# Patient Record
Sex: FEMALE | Race: WHITE | Employment: PART TIME | URBAN - METROPOLITAN AREA
[De-identification: names, ages, dates, MRNs, and addresses within clinical notes are randomized per-mention and may not be internally consistent; named-entity substitution may affect disease eponyms.]

---

## 2018-07-25 ENCOUNTER — HOSPITAL ENCOUNTER (OUTPATIENT)
Dept: GENERAL RADIOLOGY | Age: 68
Discharge: HOME | End: 2018-07-25
Payer: MEDICARE

## 2018-07-25 DIAGNOSIS — M21.612 BUNION, LEFT: ICD-10-CM

## 2018-07-25 PROCEDURE — 73630 X-RAY EXAM OF FOOT: CPT

## 2018-10-16 RX ORDER — FAMOTIDINE 20 MG/1
20 TABLET, FILM COATED ORAL AS NEEDED
Status: ON HOLD | COMMUNITY
End: 2020-03-18

## 2018-10-16 RX ORDER — ACETAMINOPHEN 500 MG
500 TABLET ORAL
COMMUNITY

## 2018-10-16 RX ORDER — MELOXICAM 7.5 MG/1
7.5 TABLET ORAL
COMMUNITY

## 2018-10-16 RX ORDER — DIPHENHYDRAMINE HCL 25 MG
25 TABLET ORAL
Status: ON HOLD | COMMUNITY
End: 2018-10-19

## 2018-10-18 ENCOUNTER — ANESTHESIA EVENT (OUTPATIENT)
Dept: SURGERY | Age: 68
End: 2018-10-18
Payer: MEDICARE

## 2018-10-18 RX ORDER — SODIUM CHLORIDE, SODIUM LACTATE, POTASSIUM CHLORIDE, CALCIUM CHLORIDE 600; 310; 30; 20 MG/100ML; MG/100ML; MG/100ML; MG/100ML
125 INJECTION, SOLUTION INTRAVENOUS CONTINUOUS
Status: CANCELLED | OUTPATIENT
Start: 2018-10-18

## 2018-10-18 RX ORDER — HYDROMORPHONE HYDROCHLORIDE 1 MG/ML
.25-1 INJECTION, SOLUTION INTRAMUSCULAR; INTRAVENOUS; SUBCUTANEOUS
Status: CANCELLED | OUTPATIENT
Start: 2018-10-18

## 2018-10-18 RX ORDER — DIPHENHYDRAMINE HYDROCHLORIDE 50 MG/ML
12.5 INJECTION, SOLUTION INTRAMUSCULAR; INTRAVENOUS AS NEEDED
Status: CANCELLED | OUTPATIENT
Start: 2018-10-18 | End: 2018-10-18

## 2018-10-18 RX ORDER — MIDAZOLAM HYDROCHLORIDE 1 MG/ML
2 INJECTION, SOLUTION INTRAMUSCULAR; INTRAVENOUS
Status: CANCELLED | OUTPATIENT
Start: 2018-10-18

## 2018-10-18 NOTE — H&P
Gill Vaughan DPM - Ricardo Bowser DPM 
                              Podiatric Surgery Pre-Operative History & Physical 
 
Subjective: 
 
Notes pain and deformity to left foot Pt has tried conservative measures such as padding strapping and wider shoes which have failed. Pt at this time ready to undergo surgical correction for their condition. There are no interval updates to the patient's H&P since PCP clearance. History: LEFT FOOT BUNION  
PLANTAR FASCIA FIBROMATOSIS Allergies Allergen Reactions  Sulfa (Sulfonamide Antibiotics) Unknown (comments) Family History Problem Relation Age of Onset  Stroke Mother  Heart Disease Father Past Medical History:  
Diagnosis Date  Arthritis 10/16/2018 Spine / neck  GERD (gastroesophageal reflux disease) Past Surgical History:  
Procedure Laterality Date  HX ADENOIDECTOMY  HX CHOLECYSTECTOMY 25 years ago  HX COLONOSCOPY    
 HX OTHER SURGICAL  2002 Cyst removed from back  HX TONSILLECTOMY Social History Tobacco Use  Smoking status: Never Smoker  Smokeless tobacco: Never Used Substance Use Topics  Alcohol use: No  
   
Social History Substance and Sexual Activity Alcohol Use No  
 
Social History Substance and Sexual Activity Drug Use No  
  
Social History Tobacco Use Smoking Status Never Smoker Smokeless Tobacco Never Used No current facility-administered medications for this encounter. Current Outpatient Medications Medication Sig  diphenhydrAMINE (BENADRYL ALLERGY) 25 mg tablet Take 25 mg by mouth two (2) times daily as needed for Sleep. Some type of generic allergy tablet  acetaminophen (TYLENOL EXTRA STRENGTH) 500 mg tablet Take 500 mg by mouth every six (6) hours as needed for Pain.  famotidine (PEPCID) 20 mg tablet Take 20 mg by mouth as needed.  Wheat Dextrin (BENEFIBER CLEAR) 3 gram/3.5 gram pwpk Take  by mouth daily (after breakfast).  meloxicam (MOBIC) 7.5 mg tablet Take 7.5 mg by mouth two (2) times daily as needed for Pain. Objective: 
Vitals: No data found. CV: regular rate / rhythm, +S1 / S2 
 
Pulm: lungs clear to ascultation b/l, no wheezes/rales/rhonchi Vascular: 
DP 2 of 4, PT 2 of 4, capillary fill time brisk, edema is not present, skin temperature is WNL in temp turgor and elasticity, varicosities are present B/L. Dermatological: 
 
Nails are normal, 2 mm thick, with subungual debris. Skin is dry. Neurological: DTR are present, protective sensation per 5.07 Winston Ginny monofilament is intact, patient is AAOx3, mood is normal. 
 
Orthopedic: 
 
Pain on palpation left medial 1st MPJ. Hallux valgus deformity- left foot ROM decreased in DF upon WB 
ROM 1st Met cunieform within 10 in DF- no elevatus Deformity is trackbound B/L LE are asymmetric, ROM of ankle, STJ, 1st MTPJ is WNL, MMT 5/5 for B/L LE. Constitutional: Pt is a well developed elderly thin female Lymphatics: no tenderness to palpation of neck/axillary/inguinal nodes. Imaging: LFXR: 
 IMPRESSION:  No acute abnormality.  Moderate first MTP joint degenerative change  
with hallux valgus deformity. Labs: 
No results for input(s): HGB, HCT, INR, NA, K, CL, CO2, BUN, CREA, GLU, HGBEXT, HCTEXT in the last 72 hours. No lab exists for component: INREXT Assessment/Plan: 
 
 
79year old female with PMH significant for Osteoathritis and GERD had a painful hallux valgus - Pt to OR for surgical correction of hallux valgus deformity. 
-   All alternatives, risks, benefits, and complications of proposed procedure(s) discussed at length with the patient. All questions/concerns answered/addressed. No gaurentees given.

## 2018-10-19 ENCOUNTER — ANESTHESIA (OUTPATIENT)
Dept: SURGERY | Age: 68
End: 2018-10-19
Payer: MEDICARE

## 2018-10-19 ENCOUNTER — HOSPITAL ENCOUNTER (OUTPATIENT)
Age: 68
Setting detail: OUTPATIENT SURGERY
Discharge: HOME | End: 2018-10-19
Attending: PODIATRIST | Admitting: PODIATRIST
Payer: MEDICARE

## 2018-10-19 ENCOUNTER — APPOINTMENT (OUTPATIENT)
Dept: GENERAL RADIOLOGY | Age: 68
End: 2018-10-19
Attending: PODIATRIST
Payer: MEDICARE

## 2018-10-19 VITALS
DIASTOLIC BLOOD PRESSURE: 67 MMHG | BODY MASS INDEX: 26.4 KG/M2 | HEART RATE: 46 BPM | HEIGHT: 60 IN | TEMPERATURE: 98.7 F | SYSTOLIC BLOOD PRESSURE: 125 MMHG | OXYGEN SATURATION: 99 % | RESPIRATION RATE: 12 BRPM | WEIGHT: 134.48 LBS

## 2018-10-19 DIAGNOSIS — G89.18 POST-OP PAIN: Primary | ICD-10-CM

## 2018-10-19 PROCEDURE — 77030022323 HC BLD RASP RECIP BRSM -B: Performed by: PODIATRIST

## 2018-10-19 PROCEDURE — C1769 GUIDE WIRE: HCPCS | Performed by: PODIATRIST

## 2018-10-19 PROCEDURE — C1713 ANCHOR/SCREW BN/BN,TIS/BN: HCPCS | Performed by: PODIATRIST

## 2018-10-19 PROCEDURE — 74011000250 HC RX REV CODE- 250: Performed by: PODIATRIST

## 2018-10-19 PROCEDURE — 77030000032 HC CUF TRNQT ZIMM -B: Performed by: PODIATRIST

## 2018-10-19 PROCEDURE — 77030018836 HC SOL IRR NACL ICUM -A: Performed by: PODIATRIST

## 2018-10-19 PROCEDURE — 73630 X-RAY EXAM OF FOOT: CPT

## 2018-10-19 PROCEDURE — 77030002933 HC SUT MCRYL J&J -A: Performed by: PODIATRIST

## 2018-10-19 PROCEDURE — 77030020782 HC GWN BAIR PAWS FLX 3M -B

## 2018-10-19 PROCEDURE — 77030006632 HC BLD ENDOSC DISP STRY -C: Performed by: PODIATRIST

## 2018-10-19 PROCEDURE — 76210000026 HC REC RM PH II 1 TO 1.5 HR: Performed by: PODIATRIST

## 2018-10-19 PROCEDURE — 76010000149 HC OR TIME 1 TO 1.5 HR: Performed by: PODIATRIST

## 2018-10-19 PROCEDURE — 77030020268 HC MISC GENERAL SUPPLY: Performed by: PODIATRIST

## 2018-10-19 PROCEDURE — 74011250636 HC RX REV CODE- 250/636

## 2018-10-19 PROCEDURE — 74011250636 HC RX REV CODE- 250/636: Performed by: PODIATRIST

## 2018-10-19 PROCEDURE — 77030006773 HC BLD SAW OSC BRSM -A: Performed by: PODIATRIST

## 2018-10-19 PROCEDURE — 73620 X-RAY EXAM OF FOOT: CPT

## 2018-10-19 PROCEDURE — 76060000033 HC ANESTHESIA 1 TO 1.5 HR: Performed by: PODIATRIST

## 2018-10-19 PROCEDURE — 74011250636 HC RX REV CODE- 250/636: Performed by: ANESTHESIOLOGY

## 2018-10-19 RX ORDER — SODIUM CHLORIDE 0.9 % (FLUSH) 0.9 %
5-10 SYRINGE (ML) INJECTION AS NEEDED
Status: CANCELLED | OUTPATIENT
Start: 2018-10-19

## 2018-10-19 RX ORDER — SODIUM CHLORIDE, SODIUM LACTATE, POTASSIUM CHLORIDE, CALCIUM CHLORIDE 600; 310; 30; 20 MG/100ML; MG/100ML; MG/100ML; MG/100ML
125 INJECTION, SOLUTION INTRAVENOUS CONTINUOUS
Status: DISCONTINUED | OUTPATIENT
Start: 2018-10-19 | End: 2018-10-19 | Stop reason: HOSPADM

## 2018-10-19 RX ORDER — NALOXONE HYDROCHLORIDE 0.4 MG/ML
0.2 INJECTION, SOLUTION INTRAMUSCULAR; INTRAVENOUS; SUBCUTANEOUS
Status: DISCONTINUED | OUTPATIENT
Start: 2018-10-19 | End: 2018-10-19 | Stop reason: HOSPADM

## 2018-10-19 RX ORDER — CEPHALEXIN 250 MG/1
500 CAPSULE ORAL 4 TIMES DAILY
Qty: 20 CAP | Refills: 0 | Status: ON HOLD | OUTPATIENT
Start: 2018-10-19 | End: 2020-03-18

## 2018-10-19 RX ORDER — IBUPROFEN 200 MG
800 TABLET ORAL
Qty: 60 TAB | Refills: 2 | Status: ON HOLD | OUTPATIENT
Start: 2018-10-19 | End: 2020-03-18

## 2018-10-19 RX ORDER — SODIUM CHLORIDE 0.9 % (FLUSH) 0.9 %
5-10 SYRINGE (ML) INJECTION EVERY 8 HOURS
Status: CANCELLED | OUTPATIENT
Start: 2018-10-19

## 2018-10-19 RX ORDER — DEXAMETHASONE SODIUM PHOSPHATE 4 MG/ML
INJECTION, SOLUTION INTRA-ARTICULAR; INTRALESIONAL; INTRAMUSCULAR; INTRAVENOUS; SOFT TISSUE AS NEEDED
Status: DISCONTINUED | OUTPATIENT
Start: 2018-10-19 | End: 2018-10-19 | Stop reason: HOSPADM

## 2018-10-19 RX ORDER — LIDOCAINE HYDROCHLORIDE 10 MG/ML
0.1 INJECTION, SOLUTION EPIDURAL; INFILTRATION; INTRACAUDAL; PERINEURAL AS NEEDED
Status: DISCONTINUED | OUTPATIENT
Start: 2018-10-19 | End: 2018-10-19 | Stop reason: HOSPADM

## 2018-10-19 RX ORDER — CEFAZOLIN SODIUM/WATER 2 G/20 ML
2 SYRINGE (ML) INTRAVENOUS ONCE
Status: COMPLETED | OUTPATIENT
Start: 2018-10-19 | End: 2018-10-19

## 2018-10-19 RX ORDER — HYDROCODONE BITARTRATE AND ACETAMINOPHEN 5; 325 MG/1; MG/1
1 TABLET ORAL
Qty: 40 TAB | Status: ON HOLD | OUTPATIENT
Start: 2018-10-19 | End: 2020-03-18

## 2018-10-19 RX ORDER — PROPOFOL 10 MG/ML
INJECTION, EMULSION INTRAVENOUS AS NEEDED
Status: DISCONTINUED | OUTPATIENT
Start: 2018-10-19 | End: 2018-10-19 | Stop reason: HOSPADM

## 2018-10-19 RX ORDER — FENTANYL CITRATE 50 UG/ML
INJECTION, SOLUTION INTRAMUSCULAR; INTRAVENOUS AS NEEDED
Status: DISCONTINUED | OUTPATIENT
Start: 2018-10-19 | End: 2018-10-19 | Stop reason: HOSPADM

## 2018-10-19 RX ORDER — PROPOFOL 10 MG/ML
INJECTION, EMULSION INTRAVENOUS
Status: DISCONTINUED | OUTPATIENT
Start: 2018-10-19 | End: 2018-10-19 | Stop reason: HOSPADM

## 2018-10-19 RX ORDER — FLUMAZENIL 0.1 MG/ML
0.2 INJECTION INTRAVENOUS
Status: DISCONTINUED | OUTPATIENT
Start: 2018-10-19 | End: 2018-10-19 | Stop reason: HOSPADM

## 2018-10-19 RX ORDER — LIDOCAINE HYDROCHLORIDE 20 MG/ML
INJECTION, SOLUTION INFILTRATION; PERINEURAL AS NEEDED
Status: DISCONTINUED | OUTPATIENT
Start: 2018-10-19 | End: 2018-10-19 | Stop reason: HOSPADM

## 2018-10-19 RX ORDER — MIDAZOLAM HYDROCHLORIDE 1 MG/ML
INJECTION, SOLUTION INTRAMUSCULAR; INTRAVENOUS AS NEEDED
Status: DISCONTINUED | OUTPATIENT
Start: 2018-10-19 | End: 2018-10-19 | Stop reason: HOSPADM

## 2018-10-19 RX ORDER — BUPIVACAINE HYDROCHLORIDE 5 MG/ML
INJECTION, SOLUTION EPIDURAL; INTRACAUDAL AS NEEDED
Status: DISCONTINUED | OUTPATIENT
Start: 2018-10-19 | End: 2018-10-19 | Stop reason: HOSPADM

## 2018-10-19 RX ADMIN — FENTANYL CITRATE 50 MCG: 50 INJECTION, SOLUTION INTRAMUSCULAR; INTRAVENOUS at 11:13

## 2018-10-19 RX ADMIN — Medication 2 G: at 11:12

## 2018-10-19 RX ADMIN — PROPOFOL 30 MG: 10 INJECTION, EMULSION INTRAVENOUS at 11:16

## 2018-10-19 RX ADMIN — PROPOFOL 100 MCG/KG/MIN: 10 INJECTION, EMULSION INTRAVENOUS at 11:16

## 2018-10-19 RX ADMIN — LIDOCAINE HYDROCHLORIDE 40 MG: 20 INJECTION, SOLUTION INFILTRATION; PERINEURAL at 11:12

## 2018-10-19 RX ADMIN — MIDAZOLAM HYDROCHLORIDE 2 MG: 1 INJECTION, SOLUTION INTRAMUSCULAR; INTRAVENOUS at 11:09

## 2018-10-19 RX ADMIN — PROPOFOL 20 MG: 10 INJECTION, EMULSION INTRAVENOUS at 11:20

## 2018-10-19 RX ADMIN — SODIUM CHLORIDE, SODIUM LACTATE, POTASSIUM CHLORIDE, AND CALCIUM CHLORIDE 125 ML/HR: 600; 310; 30; 20 INJECTION, SOLUTION INTRAVENOUS at 09:13

## 2018-10-19 NOTE — DISCHARGE INSTRUCTIONS
Karen Bergman DPM - Jhonny Hillman DPADRIENNE - Nathanielella Antwon Cuadra, DP                                       Podiatric Surgery Post-Operative Instructions    1. Foot surgery is more inconvenient than painful. Although it is normal to have some pain after surgery, you may be surprised at how well you actually feel. In spite of this, it is imperative that you follow the instructions carefully and prepare yourself and your family for an extended period of rest and convalescence. 2. Numbness in the feet will wear off after approximately five to twelve hours (this will vary patient to patient). 3. Start taking your pain medication immediately upon returning home. First take the anti-inflammatory medicine with food or milk. Take the anti-inflammatory even if you experience no pain. The painkiller is only for pain not relieved by the anti-inflammatory and should be taken only if needed. Stop the medication if you develop any abnormal rash, stomach pains, or unusual sensation and call the office. The following medications have been prescribed to you:    Rx pain killer: Hydrocodone 5/325 one tab by mouth every 4 hrs as needed for pain    Rx antibiotic: Keflex 500 mg one tab by mouth twice daily for 10 days    Rx anti-inflammatoroies: Motrin 800mg one tab by mouth every 8 hours with food    4. Black and blue toes or black and blue in areas un-bandaged are a normal occurrence and resolves unremarkably after surgery. 5. Use a shower protector prescribed by your doctor (can obtain from [employer]), doubled up plastic trash bags with rubber bands, or you may sponge bathe. The bandage must be kept completely dry after surgery. Notify the doctor if your bandage becomes wet. 6. Your feet should be elevated above your hip at all times. DO not sit with the foot on a hassock or chair.   When traveling, sit in the back seat with the foot elevated on pillows. You should sleep with the surgical shoe on for the first three weeks post-operatively or until notified by your doctor that it is safe to be removed while sleeping. 7. Ice may be indicated for your surgery, you should put a cold pack behind the knee of the operative leg, 20 minutes on, 20 minutes off, especially in the first 1-2 days post op. This will reduce pain and swelling and aid in a faster recovery. Instructions for Walking After Foot Surgery    1. Weightbearing status: left foot heel weight bearing  2. Most patients can walk with only the surgical shoe after foot surgery (canes and walkers may be needed for additional stability; this will be determined by the doctor or nurses post-operatively. 3. Crutch walking is usually not necessary post-operatively. Many patients cannot use crutches safely due to body type, muscle weakness, or balance issues. In fact, these patients are usually at a greater risk of falling with crutches than without. If crutches are recommended you will be instructed as to their proper use. The following are important rules regarding walking after foot surgery for the first one to three weeks: You may walk or stand for bathroom or meals only. No prolonged standing or walking. No public places. Extremely limited use of stairs. Try to stay on one level of your home as long as possible during the day. General rule; 50 minutes off foot with limb elevated, 10 minutes on foot (maximum) for meals and bathroom only. When climbing stairs carefully advance one foot at a time (flatfooted) with assistance of a person and the banister or on your buttocks. Walking With the Surgical Shoe    The shoe should be snuggly applied as to avoid movement or shifting while attempting to stand. Please sleep with the shoe on as stated unless otherwise directed by your doctor. While walking, the right foot should be turned out at approximately 2 oclock.   Left foot should be turned outward at approximately 10 oclock. Walk with the surgical foot rotated outward as shown. DO not pull toes up in the air by walking on your heel. Keep your foot flat and literally drag it along slightly off the ground surface. Do not push off or bend the front of your foot while walking (no propulsion). Wear a shoe or sneaker of comparable heel height on the non-surgical foot. SIMPLY WALK ON THE FLAT OF YOUR FOOT WITH THE WEIGHT SHIFTED BACKWARDS TOWARDS THE HEEL AND THE BIG TOE TURNED OUTWARD. If you have additional questions or need assistance please call our office for emergency service 24 hours a day. Do not initiate any action or remove the bandages unless directed by your doctor. If you notice any excessive bleeding or have pain not relieved by the prescribed medications, please call the emergency number without hesitation. DISCHARGE SUMMARY from your Nurse    The following personal items collected during your admission are returned to you:   Dental Appliance: Dental Appliances: None  Vision: Visual Aid: Glasses, With patient  Hearing Aid:    Jewelry: Jewelry: None  Clothing: Clothing: Socks, Pants, Footwear, Shirt, Undergarments(placed in locker)  Other Valuables: Other Valuables: Eyeglasses, Other (comment)(post op shoe w/ pt belongings)  Valuables sent to safe:      PATIENT INSTRUCTIONS:    After general anesthesia or intravenous sedation, for 24 hours or while taking prescription Narcotics:  · Limit your activities  · Do not drive and operate hazardous machinery  · Do not make important personal or business decisions  · Do  not drink alcoholic beverages  · If you have not urinated within 8 hours after discharge, please contact your surgeon on call.     Report the following to your surgeon:  · Excessive pain, swelling, redness or odor of or around the surgical area  · Temperature over 100.5  · Nausea and vomiting lasting longer than 4 hours or if unable to take medications  · Any signs of decreased circulation or nerve impairment to extremity: change in color, persistent  numbness, tingling, coldness or increase pain  · Any questions    COUGH AND DEEP BREATHE    Breathing deep and coughing are very important exercises to do after surgery. Deep breathing and coughing open the little air tubes and air sacks in your lungs. You take deep breaths every day. You may not even notice - it is just something you do when you sigh or yawn. It is a natural exercise you do to keep these air passages open. After surgery, take deep breaths and cough, on purpose. Coughing and deep breathing help prevent bronchitis and pneumonia after surgery. If you had chest or belly surgery, use a pillow as a \"hug jaciel\" and hold it tightly to your chest or belly when you cough. DIRECTIONS:  6. Take 10 to 15 slow deep breaths every hour while awake. 7. Breathe in deeply, and hold it for 2 seconds. 8. Exhale slowly through puckered lips, like blowing up a balloon. 9. After every 4th or 5th deep breath, hug your pillow to your chest or belly and give a hard, deep cough. Yes, it will probably hurt. But doing this exercise is very important part of healing after surgery. Take your pain medicine to help you do this exercise without too much pain. IF YOU HAVE BEEN DIAGNOSED WITH SLEEP APNEA, PLEASE USE YOUR SLEEP APNEA DEVICE OR CPAP MACHINE WHEN YOU INTEND TO NAP AFTER TAKING PAIN MEDICATION. Ankle Pumps    Ankle pumps increase the circulation of oxygenated blood to your lower extremities and decrease your risk for circulation problems such as blood clots. They also stretch the muscles, tendons and ligaments in your foot and ankle, and prevent joint contracture in the ankle and foot, especially after surgeries on the legs. It is important to do ankle pump exercises regularly after surgery because immobility increases your risk for developing a blood clot.   Your doctor may also have you take an Aspirin for the next few days as well. If your doctor did not ask you to take an Aspirin, consult with him before starting Aspirin therapy on your own. Slowly point your foot forward, feeling the muscles on the top of your lower leg stretch, and hold this position for 5 seconds. Next, pull your foot back toward you as far as possible, stretching the calf muscles, and hold that position for 5 seconds. Repeat with the other foot. Perform 10 repetitions every hour while awake for both ankles if possible (down and then up with the foot once is one repetition). You should feel gentle stretching of the muscles in your lower leg when doing this exercise. If you feel pain, or your range of motion is limited, don't  Push too hard. Only go the limit your joint and muscles will let you go. If you have increasing pain, progressively worsening leg warmth or swelling, STOP the exercise and call your doctor. Below is information about the medications your doctor is prescribing after your visit:    Other information in your discharge envelope:  []     PRESCRIPTIONS  []     PHYSICAL THERAPY PRESCRIPTION  []     APPOINTMENT CARDS  []     Regional Anesthesia Pamphlet for block or block with On-Q Catheter from Anesthesia Service  []     Medical device information sheets/pamphlets from their    []     School/work excuse note. []     /parent work excuse note. These are general instructions for a healthy lifestyle:    *  Please give a list of your current medications to your Primary Care Provider. *  Please update this list whenever your medications are discontinued, doses are      changed, or new medications (including over-the-counter products) are added. *  Please carry medication information at all times in case of emergency situations.     About Smoking  No smoking / No tobacco products / Avoid exposure to second hand smoke    Surgeon General's Warning:  Quitting smoking now greatly reduces serious risk to your health. Obesity, smoking, and sedentary lifestyle greatly increases your risk for illness and disease. A healthy diet, regular physical exercise & weight monitoring are important for maintaining a healthy lifestyle. Congestive Heart Failure  You may be retaining fluid if you have a history of heart failure or if you experience any of the following symptoms:  Weight gain of 3 pounds or more overnight or 5 pounds in a week, increased swelling in our hands or feet or shortness of breath while lying flat in bed. Please call your doctor as soon as you notice any of these symptoms; do not wait until your next office visit. Recognize signs and symptoms of STROKE:  F - face looks uneven  A - arms unable to move or move even  S - speech slurred or non-existent  T - time-call 911 as soon as signs and symptoms begin-DO NOT go         Back to bed or wait to see if you get better-TIME IS BRAIN. Warning signs of HEART ATTACK  Call 911 if you have these symptoms    · Chest discomfort. Most heart attacks involve discomfort in the center of the chest that lasts more than a few minutes, or that goes away and comes back. It can feel like uncomfortable pressure, squeezing, fullness, or pain. · Discomfort in other areas of the upper body. Symptoms can include pain or discomfort in one or both        Arms, the back, neck, jaw, or stomach. ·  Shortness of breath with or without chest discomfort. · Other signs may include breaking out in a cold sweat, nausea, or lightheadedness    Don't wait more than five minutes to call 911 - MINUTES MATTER! Fast action can save your life. Calling 911 is almost always the fastest way to get lifesaving treatment. Emergency Medical Services staff can begin treatment when they arrive - up to an hour sooner than if someone gets to the hospital by car.       [facility] MEDICATION AND SIDE EFFECT GUIDE    The [facility] MEDICATION AND SIDE EFFECT GUIDE was provided to the PATIENT AND CARE PROVIDER.   Information provided includes instruction about drug purpose and common side effects for the following medications:    · Keflex  · Motrin  · Norco

## 2018-10-19 NOTE — ANESTHESIA POSTPROCEDURE EVALUATION
Procedure(s): LEFT FOOT ELIO BUNIONECTOMY TANJA BUNIONECTOMY, LEFT FOOT  (MAC WITH LOCAL). Anesthesia Post Evaluation Multimodal analgesia: multimodal analgesia used between 6 hours prior to anesthesia start to PACU discharge Patient location during evaluation: PACU Patient participation: complete - patient participated Level of consciousness: awake and alert Airway patency: patent Anesthetic complications: no 
Cardiovascular status: acceptable Respiratory status: acceptable Hydration status: acceptable Visit Vitals BP (P) 125/67 (BP 1 Location: Left arm, BP Patient Position: At rest) Pulse (!) 46 Temp (P) 37.1 °C (98.7 °F) Resp 12 Ht 5' (1.524 m) Wt 61 kg (134 lb 7.7 oz) SpO2 99% BMI 26.26 kg/m²

## 2018-10-19 NOTE — ANESTHESIA PREPROCEDURE EVALUATION
Anesthetic History No history of anesthetic complications Review of Systems / Medical History Patient summary reviewed, nursing notes reviewed and pertinent labs reviewed Pulmonary Within defined limits Neuro/Psych Within defined limits Cardiovascular Within defined limits Comments: Not on Beta Blocker GI/Hepatic/Renal 
Within defined limits GERD Endo/Other Within defined limits Arthritis Other Findings Physical Exam 
 
Airway Mallampati: II 
TM Distance: 4 - 6 cm Neck ROM: decreased range of motion Mouth opening: Normal 
 
 Cardiovascular Regular rate and rhythm,  S1 and S2 normal,  no murmur, click, rub, or gallop Rhythm: regular Rate: normal 
 
 
 
 Dental 
No notable dental hx Pulmonary Breath sounds clear to auscultation Abdominal 
GI exam deferred Other Findings Anesthetic Plan ASA: 2 Anesthesia type: MAC Anesthetic plan and risks discussed with: Patient

## 2018-10-19 NOTE — H&P
Date of Surgery Update: 
Raphael Velazquez was seen and examined. History and physical has been reviewed. The patient has been examined.  There have been no significant clinical changes since the completion of the originally dated History and Physical. 
 
Signed By: Jerlene Hamman, DPM   
 October 19, 2018 10:01 AM

## 2018-10-19 NOTE — OP NOTES
Operative Report     Procedure Date: 10/19/18     Surgeon: Yoli Palumbo. Annmarie Mcdonald DPM    1st Assistant: Luann Vaughan DPM     Pre-Op diagnosis: LT hallux abducto valgus      Post-Op diagnosis: LT hallux abducto valgus      Procedure: Leonardo Rigoberto / Samantha Ro bunionectomy with screw fixation     Pathology: none    Anesthesia: local MAC with 20 cc 1:1 1% plain lidocaine / 0.5% plain marcaine     Hemostasis: PAT @ 250 mmHg x 51 minutes     Estimated Blood loss: 20 cc     Materials: 3-0 monocryl, 4-0 monocryl, 3.0 Osteomed screws x 2     Injectables: 1 cc dexamethasone phosphate     Complications: none     Description of procedure:  Pre-operative patient identification was carried out by myself, then the patient was brought to the operating room and placed on the operating table in a supine position. After adequate anesthesia was obtained the LT LE was then prepped and draped in the normal sterile fashion.     Attention was directed to the dorsal aspect of the 1st metatarsal head of the LT foot where a 6cm linear longitudinal incision was made medial & parallel to the tendon of the extensor hallucis longus tendon & involved the contour of the deformity. The incision was opened through the subcutaneous tissues using sharp & blunt dissection. Care was taken to identify & retract all vital neural & vascular structures. All bleeders were ligated & cauterized as necessary. The periosteal & capsular structures were then carefully freed of their osseous attachments & reflected medially & laterally thus exposing the head of the 1st metatarsal at the operative site. Next, utilizing a sagittal bone saw, the dorsal & medial prominences were resected & passed from the operative field. All rough edges were then smoothed with a bone rasp & sagittal bone saw.     A chevron osteotomy was then performed at the distal aspect of the LT 1st metatarsal, and the metatarsal head was transposed laterally and impacted on the proximal aspect of the 1st metatarsal, and then the distal aspect of the osteotomy was fixated temporarily with a K wire, then the 2 Osteomed screws were placed across the osteotomy with excellent compression noted. Satisfied with our correction, temporary fixation was removed, and the wound was then irrigated with copious amounts of sterile normal saline. The periosteal & capsular structures were reapproximated & coapted utilizing 3-0 monocryl. Redundant capsular tissue was resected as necessary. The subcuticular tissues were then reapproximated & coapted utilizing 4-0 monocryl and and the skin was reapproximated & coapted utilizing 4-0 monocryl in simple interrupted fashion. 1 cc of dexamethasone phosphate was injected to the joint following the procedure. Surgical site was then dressed with xeroform, gauze, pawel, ACE. The patient tolerated the procedure and anesthesia well, and was transported from the operating room to the PACU with vital signs stable and with the neurovascular status of the operative foot intact.

## 2018-10-19 NOTE — BRIEF OP NOTE
BRIEF OPERATIVE NOTE Date of Procedure: 10/19/2018 Preoperative Diagnosis: LEFT FOOT BUNION Postoperative Diagnosis: LEFT FOOT BUNION Procedure(s): LEFT FOOT ELIO BUNIONECTOMY TANJA BUNIONECTOMY, LEFT FOOT  (MAC WITH LOCAL) Surgeon(s) and Role: Kyle Gonzalez, CORNELLM - Primary Beatriz Koch, ARABELLA - 1st Asst 
   
 
Surgical Staff: 
Circ-1: Juancho Marks RN 
Circ-Relief: Enedelia Dobbs RN Scrub Tech-1: Gomes Person Scrub Tech-Relief: Arina Santiago Event Time In Time Out Incision Start [id number] Incision Close 1216 Anesthesia: MAC Estimated Blood Loss: minimal 
Specimens: * No specimens in log * Findings: as dictated Complications: none Implants:  
Implant Name Type Inv. Item Serial No.  Lot No. LRB No. Used Action  
screw   NA [employer]STEOMED LOLA NA Left 1 Implanted  
screw   NA [employer] LOLA NA Left 1 Implanted

## 2018-11-27 ENCOUNTER — HOSPITAL ENCOUNTER (OUTPATIENT)
Dept: GENERAL RADIOLOGY | Age: 68
Discharge: HOME | End: 2018-11-27
Attending: PODIATRIST
Payer: MEDICARE

## 2018-11-27 DIAGNOSIS — M20.12 ACQUIRED HALLUX VALGUS OF LEFT FOOT: ICD-10-CM

## 2018-11-27 PROCEDURE — 73630 X-RAY EXAM OF FOOT: CPT

## 2020-03-11 NOTE — H&P
Patient Name: Anita Kelseyrand Loss   Account #: [id number]    Gender: Female    (age): 1950 (81)       Provider:     Abdirizak Canchola MD        Referring Physician:     Rashad Rosales [address] [facility] [address], [address] [place] [address]  [phone] (phone)  [phone] (fax)        Chief Complaint: pt in for trouble digesting her food           History of Present Illness: The patient is seen for the evaluation of abnormal barium swallow. Noted the onset of regurgitation, sense of a lump in the throat and heartburn 4 months ago. Symptoms have been occurring many time(s) per weeks. During a given day, they are most prevalent shortly after eating meals. They are exacerbated by nothing specific. In the past, the patient has tried OTC antacids, esomeprazole. Currently takes OTC antacids dosed every day. On this therapy, symptom response has been minimal. Associated symptoms include sense of a lump in the throat, but patient denies  Barium swallow obtained this abnormal; the area appears to be slow transit of barium through the esophagus; there is no definite focal point of obstruction? .? ?        Past Medical History      Medical Conditions: Arthritis  Hemorrhoids   Surgical Procedures: Bunionectomy  Gallbladder surgery     Dx Studies: Colonoscopy   Medications: Benefiber Clear SF (dextrin) 3 gram/3.5 gram 1 by mouth once a day as directed  meloxicam 15 mg Take 1 tablet by mouth once a day as directed  meloxicam (bulk) 100% 1 once a day as directed  Once Daily Take 1 by mouth once a day as directed  Tums 200 mg calcium (500 mg) Take 1 by mouth once a day as directed  Tylenol 325 mg Take 1 by mouth once a day as directed   Allergies: Sulfa (Sulfonamide Antibiotics)   Immunizations: Influenza, seasonal, injectable, 10/1/2019      Social History      Alcohol: None   Tobacco: Never smoker   Drugs: None   Exercise: None   Caffeine: None   Marital Status:          Occupation:               Family History No Knowledge Of Family History       Review of Systems:   Cardiovascular: Denies chest pain, irregular heart beat, palpitations, peripheral edema, syncope, Sweats. Constitutional: Denies fatigue, fever, loss of appetite, weight gain, weight loss. ENMT: Presents suffers from sore throat. Denies nose bleeds, hearing loss. Endocrine: Denies excessive thirst, heat intolerance. Eyes: Denies loss of vision. Gastrointestinal: Presents suffers from Bloating/gas, heartburn. Denies abdominal pain, abdominal swelling, change in bowel habits, constipation, diarrhea, jaundice, nausea, rectal bleeding, stomach cramps, vomiting, dysphagia, rectal pain, Stool incontinence, hematemesis. Genitourinary: Denies dark urine, dysuria, frequent urination, hematuria, incontinence. Hematologic/Lymphatic: Denies easy bruising, prolonged bleeding. Integumentary: Denies itching, rashes, sun sensitivity. Musculoskeletal: Presents suffers from arthritis. Denies back pain, gout, joint pain, muscle weakness, stiffness. Neurological: Denies dizziness, fainting, frequent headaches, memory loss. Psychiatric: Presents suffers from anxiety, difficulty sleeping, nervousness. Denies depression, hallucinations, panic attacks, paranoia. Respiratory: Denies cough, dyspnea, wheezing. Vital Signs: see nursing notes     Physical Exam:   Constitutional:      Appearance: No distress, appears comfortable. Skin:      Inspection: No rash, no jaundice. Head/face: Inspection: Normacephalic, atraumatic. Eyes:      Conjunctivae/lids: Normal.   ENMT:      External: Normal.   Neck:      Neck: Normal appearance, trachea midline. Respiratory:      Effort: Normal respiratory effort, comfortable, speaks in complete sentences. Auscultation: normal breath sounds, no rubs, wheezes or rhonchi. Cardiovascular: Auscultation: normal, S1 and S2, no gallops , no rubs or murmurs .    Gastrointestinal/Abdomen:      Abdomen: non-distended, nontender. Liver/Spleen: normal, normal size, Liver size and consistency normal, spleen is non-palpable. Musculoskeletal:      Gait/station: normal.   Muscles: normal strength and tone, no atrophy or abnormal movements. Psychiatric:      Judgment/insight: Normal, normal judgement, normal insight. Mood and affect: Normal mood, affect full, no evidence of depression, anxiety or agitation. Impressions: abnormal barium swallow        Plan: pantoprazole 40 mg Take 1 tablet by mouth once a day before breakfast for 90 days  I have discussed EGD biopsy, dilation, alternatives complications including but not limited to pain, cardiopulmonary event, bleeding, perforation; all questions answered.

## 2020-03-13 RX ORDER — PANTOPRAZOLE SODIUM 40 MG/1
40 TABLET, DELAYED RELEASE ORAL DAILY
COMMUNITY

## 2020-03-13 RX ORDER — BISMUTH SUBSALICYLATE 262 MG
1 TABLET,CHEWABLE ORAL DAILY
COMMUNITY

## 2020-03-13 NOTE — PERIOP NOTES
[address] [place] [address] Dr Jenkins Preprocedure Instructions 1. On the day of your surgery, please report to registration located on the 2nd floor of the  [facility]. yes 2. You must have a responsible adult to drive you to the hospital, stay at the hospital during your procedure and drive you home. If they leave your procedure will not be started (no exceptions). yes 3. Do not have anything to eat or drink (including water, gum, mints, coffee, and juice) after midnight. This does not apply to the medications you were instructed to take by your physician. yesIf you are currently taking Plavix, Coumadin, Aspirin, or other blood-thinning agents, contact your physician for special instructions. not applicable, 
 
4. If you are having a procedure that requires bowel prep: We recommend that you have only clear liquids the day before your procedure and begin your bowel prep by 5:00 pm.  You may continue to drink clear liquids until midnight. If for any reason you are not able to complete your prep please notify your physician immediately. not applicable 5. Have a list of all current medications, including vitamins, herbal supplements and any other over the counter medications. yes 6. If you wear glasses, contacts, dentures and/or hearing aids, they may be removed prior to procedure, please bring a case to store them in. yes 7. You should understand that if you do not follow these instructions your procedure may be cancelled. If your physical condition changes (I.e. fever, cold or flu) please contact your doctor as soon as possible. 8. It is important that you be on time. If for any reason you are unable to keep your appointment please call [phone] the day of or your physicians office prior to your scheduled procedure

## 2020-03-18 ENCOUNTER — ANESTHESIA (OUTPATIENT)
Dept: ENDOSCOPY | Age: 70
End: 2020-03-18
Payer: MEDICARE

## 2020-03-18 ENCOUNTER — ANESTHESIA EVENT (OUTPATIENT)
Dept: ENDOSCOPY | Age: 70
End: 2020-03-18
Payer: MEDICARE

## 2020-03-18 ENCOUNTER — HOSPITAL ENCOUNTER (OUTPATIENT)
Age: 70
Setting detail: OUTPATIENT SURGERY
Discharge: HOME | End: 2020-03-18
Attending: INTERNAL MEDICINE | Admitting: INTERNAL MEDICINE
Payer: MEDICARE

## 2020-03-18 VITALS
HEIGHT: 60 IN | RESPIRATION RATE: 20 BRPM | HEART RATE: 67 BPM | DIASTOLIC BLOOD PRESSURE: 65 MMHG | OXYGEN SATURATION: 100 % | WEIGHT: 128.75 LBS | BODY MASS INDEX: 25.28 KG/M2 | SYSTOLIC BLOOD PRESSURE: 108 MMHG | TEMPERATURE: 97.5 F

## 2020-03-18 LAB
H PYLORI FROM TISSUE: NEGATIVE
KIT LOT NO., HCLOLOT: NORMAL
NEGATIVE CONTROL: NEGATIVE
POSITIVE CONTROL: POSITIVE

## 2020-03-18 PROCEDURE — 88305 TISSUE EXAM BY PATHOLOGIST: CPT

## 2020-03-18 PROCEDURE — 74011250636 HC RX REV CODE- 250/636: Performed by: INTERNAL MEDICINE

## 2020-03-18 PROCEDURE — 74011250636 HC RX REV CODE- 250/636: Performed by: NURSE ANESTHETIST, CERTIFIED REGISTERED

## 2020-03-18 PROCEDURE — 77030021593 HC FCPS BIOP ENDOSC BSC -A: Performed by: INTERNAL MEDICINE

## 2020-03-18 PROCEDURE — 74011000250 HC RX REV CODE- 250: Performed by: NURSE ANESTHETIST, CERTIFIED REGISTERED

## 2020-03-18 PROCEDURE — 76060000031 HC ANESTHESIA FIRST 0.5 HR: Performed by: INTERNAL MEDICINE

## 2020-03-18 PROCEDURE — 76040000019: Performed by: INTERNAL MEDICINE

## 2020-03-18 PROCEDURE — 87077 CULTURE AEROBIC IDENTIFY: CPT | Performed by: INTERNAL MEDICINE

## 2020-03-18 RX ORDER — GLYCOPYRROLATE 0.2 MG/ML
INJECTION INTRAMUSCULAR; INTRAVENOUS AS NEEDED
Status: DISCONTINUED | OUTPATIENT
Start: 2020-03-18 | End: 2020-03-18 | Stop reason: HOSPADM

## 2020-03-18 RX ORDER — SODIUM CHLORIDE 9 MG/ML
50 INJECTION, SOLUTION INTRAVENOUS CONTINUOUS
Status: DISCONTINUED | OUTPATIENT
Start: 2020-03-18 | End: 2020-03-18 | Stop reason: HOSPADM

## 2020-03-18 RX ORDER — FENTANYL CITRATE 50 UG/ML
100 INJECTION, SOLUTION INTRAMUSCULAR; INTRAVENOUS
Status: DISCONTINUED | OUTPATIENT
Start: 2020-03-18 | End: 2020-03-18 | Stop reason: HOSPADM

## 2020-03-18 RX ORDER — PROPOFOL 10 MG/ML
INJECTION, EMULSION INTRAVENOUS AS NEEDED
Status: DISCONTINUED | OUTPATIENT
Start: 2020-03-18 | End: 2020-03-18 | Stop reason: HOSPADM

## 2020-03-18 RX ORDER — EPINEPHRINE 0.1 MG/ML
1 INJECTION INTRACARDIAC; INTRAVENOUS
Status: DISCONTINUED | OUTPATIENT
Start: 2020-03-18 | End: 2020-03-18 | Stop reason: HOSPADM

## 2020-03-18 RX ORDER — MIDAZOLAM HYDROCHLORIDE 1 MG/ML
.25-5 INJECTION, SOLUTION INTRAMUSCULAR; INTRAVENOUS
Status: DISCONTINUED | OUTPATIENT
Start: 2020-03-18 | End: 2020-03-18 | Stop reason: HOSPADM

## 2020-03-18 RX ORDER — NALOXONE HYDROCHLORIDE 0.4 MG/ML
0.4 INJECTION, SOLUTION INTRAMUSCULAR; INTRAVENOUS; SUBCUTANEOUS
Status: DISCONTINUED | OUTPATIENT
Start: 2020-03-18 | End: 2020-03-18 | Stop reason: HOSPADM

## 2020-03-18 RX ORDER — LIDOCAINE HYDROCHLORIDE 20 MG/ML
INJECTION, SOLUTION EPIDURAL; INFILTRATION; INTRACAUDAL; PERINEURAL AS NEEDED
Status: DISCONTINUED | OUTPATIENT
Start: 2020-03-18 | End: 2020-03-18 | Stop reason: HOSPADM

## 2020-03-18 RX ORDER — ATROPINE SULFATE 0.1 MG/ML
0.5 INJECTION INTRAVENOUS
Status: DISCONTINUED | OUTPATIENT
Start: 2020-03-18 | End: 2020-03-18 | Stop reason: HOSPADM

## 2020-03-18 RX ORDER — BISACODYL 5 MG
5 TABLET, DELAYED RELEASE (ENTERIC COATED) ORAL DAILY PRN
COMMUNITY

## 2020-03-18 RX ORDER — SODIUM CHLORIDE 9 MG/ML
INJECTION, SOLUTION INTRAVENOUS
Status: DISCONTINUED | OUTPATIENT
Start: 2020-03-18 | End: 2020-03-18 | Stop reason: HOSPADM

## 2020-03-18 RX ORDER — DEXTROMETHORPHAN/PSEUDOEPHED 2.5-7.5/.8
1.2 DROPS ORAL
Status: DISCONTINUED | OUTPATIENT
Start: 2020-03-18 | End: 2020-03-18 | Stop reason: HOSPADM

## 2020-03-18 RX ORDER — FLUMAZENIL 0.1 MG/ML
0.2 INJECTION INTRAVENOUS
Status: DISCONTINUED | OUTPATIENT
Start: 2020-03-18 | End: 2020-03-18 | Stop reason: HOSPADM

## 2020-03-18 RX ADMIN — PROPOFOL 100 MG: 10 INJECTION, EMULSION INTRAVENOUS at 12:54

## 2020-03-18 RX ADMIN — GLYCOPYRROLATE 0.1 MG: 0.2 INJECTION, SOLUTION INTRAMUSCULAR; INTRAVENOUS at 12:54

## 2020-03-18 RX ADMIN — PROPOFOL 100 MG: 10 INJECTION, EMULSION INTRAVENOUS at 13:00

## 2020-03-18 RX ADMIN — LIDOCAINE HYDROCHLORIDE 80 MG: 20 INJECTION, SOLUTION INTRAVENOUS at 12:54

## 2020-03-18 RX ADMIN — SODIUM CHLORIDE: 9 INJECTION, SOLUTION INTRAVENOUS at 12:54

## 2020-03-18 RX ADMIN — SODIUM CHLORIDE 50 ML/HR: 900 INJECTION, SOLUTION INTRAVENOUS at 12:17

## 2020-03-18 RX ADMIN — PROPOFOL 70 MG: 10 INJECTION, EMULSION INTRAVENOUS at 13:03

## 2020-03-18 NOTE — ANESTHESIA PREPROCEDURE EVALUATION
Anesthetic History   No history of anesthetic complications            Review of Systems / Medical History  Patient summary reviewed, nursing notes reviewed and pertinent labs reviewed    Pulmonary  Within defined limits                 Neuro/Psych   Within defined limits           Cardiovascular  Within defined limits                Exercise tolerance: >4 METS  Comments: Not on Beta Blocker   GI/Hepatic/Renal  Within defined limits   GERD: well controlled           Endo/Other  Within defined limits      Arthritis     Other Findings              Physical Exam    Airway  Mallampati: II  TM Distance: 4 - 6 cm  Neck ROM: decreased range of motion   Mouth opening: Normal     Cardiovascular  Regular rate and rhythm,  S1 and S2 normal,  no murmur, click, rub, or gallop  Rhythm: regular  Rate: normal         Dental  No notable dental hx       Pulmonary  Breath sounds clear to auscultation               Abdominal  GI exam deferred       Other Findings            Anesthetic Plan    ASA: 2  Anesthesia type: MAC            Anesthetic plan and risks discussed with: Patient

## 2020-03-18 NOTE — PERIOP NOTES
1215: Initial RN admission and assessment performed and documented in Endoscopy navigator. Patient evaluated by anesthesia in pre-procedure holding. All procedural vital signs, airway assessment, and level of consciousness information monitored and recorded by anesthesia staff on the anesthesia record. 1258: Anesthesia staff at patient's bedside administering anesthesia and monitoring patients vital signs throughout procedure. See anesthesia note. 1304: Patient tolerated procedure without problems. Abdomen soft and patient arousable and voices no complaints Report received from CRNA, see anesthesia note. Patient transported to endoscopy recovery area. Endoscope was pre-cleaned at bedside immediately following procedure by Vivien Kerns ENDO tech    Report given to recovery nurse Valeria Marvin RN.  Remy Murillo

## 2020-03-18 NOTE — DISCHARGE INSTRUCTIONS
Denise Antonio  [id number]  1950    DISCHARGE INSTRUCTIONS  Discomfort:  Redness at IV site- apply warm compress to area; if redness or soreness persist- contact your physician. There may be a slight amount of blood passed from the rectum. Gaseous discomfort - walking, belching will help relieve any discomfort. You may not operate a vehicle for 12 hours. You may not engage in an occupation involving machinery or appliances for rest of today. You may not drink alcoholic beverages for at least 12 hours. Avoid making any critical decisions for at least 24 hours. DIET:   High fiber diet. ACTIVITY:  You may resume your normal daily activities it is recommended that you spend the remainder of the day resting -  avoid any strenuous activity. CALL M.D. ANY SIGN OF:   Increasing pain, nausea, vomiting  Abdominal distension (swelling)  New increased bleeding (oral or rectal)  Fever (chills)  Pain in chest area  Bloody discharge from nose or mouth  Shortness of breath     Follow-up Instructions:  Call Dr. Isaac Mccallum if you have any questions or problems.   In spite of daily pantoprazole use  Can use Clear Lax (Miralax) daily for constipation        DISCHARGE SUMMARY from Nurse    The following personal items collected during your admission are returned to you:   Dental Appliance: Dental Appliances: None  Vision: Visual Aid: Glasses  Hearing Aid:    Jewelry:    Clothing:    Other Valuables:    Valuables sent to safe:

## 2020-03-18 NOTE — PROCEDURES
Clovis Polo M.D. 2020    Esophagogastroduodenoscopy (EGD) Procedure Note  Tamera Carson  : 1950  [facility] Medical Record Number: [id number]      Indications:    Dysphagia/odynophagia, Abnormal GI series  Referring Physician:  Dario Cotter MD  Anesthesia/Sedation: see nursing notes  Endoscopist:  Dr. Anna Martinez  Assistants: None  Permit:  The indications, risks, benefits and alternatives were reviewed with the patient or their decision maker who was provided an opportunity to ask questions and all questions were answered. The specific risks of esophagogastroduodenoscopy with conscious sedation were reviewed, including but not limited to anesthetic complication, bleeding, adverse drug reaction, missed lesion, infection, IV site reactions, and intestinal perforation which would lead to the need for surgical repair. Alternatives to EGD including radiographic imaging, observation without testing, or laboratory testing were reviewed as well as the limitations of those alternatives discussed. After considering the options and having all their questions answered, the patient or their decision maker provided both verbal and written consent to proceed. Procedure in Detail:  After obtaining informed consent, positioning of the patient in the left lateral decubitus position, and conduction of a pre-procedure pause or \"time out\" the endoscope was introduced into the mouth and advanced to the duodenum. A careful inspection was made, and findings or interventions are described below.     Findings:   Esophagus:normal  Stomach: hiatal hernia and no mucosal lesion appreciated  Duodenum/jejunum: normal    Complications/estimated blood loss: none    Therapies:  biopsy of esophagus  RYDER test biopsy    Specimens: biopsies    Implants:none           Recommendations:  -permanent recommendation of daily PPI    Thank you for entrusting me with this patient's care. Please do not hesitate to contact me with any questions or if I can be of assistance with any of your other patients' GI needs.   Signed By: Magdiel Valdez MD                        March 18, 2020

## 2020-03-18 NOTE — ROUTINE PROCESS
Jose Alejandro Davidson 1950 
[id number] Situation: 
Verbal report received from: RONALD Purdy RN Procedure: Procedure(s): ESOPHAGOGASTRODUODENOSCOPY (EGD) ESOPHAGOGASTRODUODENAL (EGD) BIOPSY Background: 
 
Preoperative diagnosis: GERD Postoperative diagnosis: Hiatal hernia :  Dr. Gokul Tapia Assistant(s): Endoscopy Technician-1: David Cyr Endoscopy RN-1: Cheyanne Rivera RN Specimens:  
ID Type Source Tests Collected by Time Destination 1 : Biopsy EG Junction Preservative   Rosa Pleitez MD 3/18/2020 1304 Pathology H. Pylori  yes Assessment: 
Intra-procedure medications Anesthesia gave intra-procedure sedation and medications, see anesthesia flow sheet yes Intravenous fluids: NS@ Yue Bolus Vital signs stable Abdominal assessment: round and soft Recommendation: 
Discharge patient per MD order. Family or Friend Permission to share finding with family or friend yes Endoscopy discharge instructions have been reviewed and given to patient and son. The patient and son verbalized understanding and acceptance of instructions.

## 2020-03-23 NOTE — ANESTHESIA POSTPROCEDURE EVALUATION
Procedure(s):  ESOPHAGOGASTRODUODENOSCOPY (EGD)  ESOPHAGOGASTRODUODENAL (EGD) BIOPSY.     MAC    Anesthesia Post Evaluation      Multimodal analgesia: multimodal analgesia used between 6 hours prior to anesthesia start to PACU discharge  Patient location during evaluation: bedside  Patient participation: complete - patient participated  Level of consciousness: awake  Pain management: adequate  Airway patency: patent  Anesthetic complications: no  Cardiovascular status: acceptable  Respiratory status: acceptable  Hydration status: acceptable        Vitals Value Taken Time   /65 3/18/2020  1:39 PM   Temp 36.4 °C (97.5 °F) 3/18/2020  1:20 PM   Pulse 67 3/18/2020  1:39 PM   Resp 20 3/18/2020  1:39 PM   SpO2 100 % 3/18/2020  1:39 PM

## 2023-05-10 RX ORDER — ACETAMINOPHEN 500 MG
TABLET ORAL EVERY 6 HOURS PRN
COMMUNITY

## 2023-05-10 RX ORDER — MELOXICAM 7.5 MG/1
TABLET ORAL 2 TIMES DAILY PRN
COMMUNITY

## 2023-05-10 RX ORDER — WHEAT DEXTRIN 3 G/3.8 G
POWDER (GRAM) ORAL
COMMUNITY

## 2023-05-10 RX ORDER — BISACODYL 5 MG/1
5 TABLET, DELAYED RELEASE ORAL DAILY PRN
COMMUNITY

## 2023-05-10 RX ORDER — PANTOPRAZOLE SODIUM 40 MG/1
40 TABLET, DELAYED RELEASE ORAL DAILY
COMMUNITY

## 2023-12-29 ENCOUNTER — OFFICE VISIT (OUTPATIENT)
Age: 73
End: 2023-12-29

## 2023-12-29 VITALS
SYSTOLIC BLOOD PRESSURE: 146 MMHG | DIASTOLIC BLOOD PRESSURE: 76 MMHG | RESPIRATION RATE: 18 BRPM | HEART RATE: 75 BPM | HEIGHT: 59 IN | WEIGHT: 128.8 LBS | TEMPERATURE: 98.9 F | OXYGEN SATURATION: 96 % | BODY MASS INDEX: 25.96 KG/M2
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
## 2023-12-29 DIAGNOSIS — S49.91XA ARM INJURY, RIGHT, INITIAL ENCOUNTER: Primary | ICD-10-CM

## 2023-12-29 RX ORDER — FAMOTIDINE 20 MG/1
20 TABLET, FILM COATED ORAL 2 TIMES DAILY
COMMUNITY

## 2023-12-29 NOTE — PATIENT INSTRUCTIONS
Thank you for choosing [facility]! Your final radiology report was read by a radiologist and it read no acute abnormality. Wear your arm sling for comfort measures. Use ice to the affected area( on for 20 minutes and off for 20 minutes). Do not apply ice directly to your skin. Alternate using Tylenol and ibuprofen for pain. You decided that you did not need a prescription pain medication. Follow up with your PCP if symptoms persist, worsen, you starting running a fever of 100.6 degrees F if not relieved by OTC Tylenol and ibuprofen. If you start to experience chest pain and/or shortness of breath, seek emergency care or call 9-1-1.

## (undated) DEVICE — ELECTRODE,RADIOTRANSLUCENT,FOAM,3PK: Brand: MEDLINE

## (undated) DEVICE — INTENDED FOR TISSUE SEPARATION, AND OTHER PROCEDURES THAT REQUIRE A SHARP SURGICAL BLADE TO PUNCTURE OR CUT.: Brand: BARD-PARKER ® CARBON RIB-BACK BLADES

## (undated) DEVICE — KERLIX BANDAGE ROLL: Brand: KERLIX

## (undated) DEVICE — STERILE POLYISOPRENE POWDER-FREE SURGICAL GLOVES: Brand: PROTEXIS

## (undated) DEVICE — LIGHT HANDLE: Brand: DEVON

## (undated) DEVICE — OCCLUSIVE GAUZE STRIP,3% BISMUTH TRIBROMOPHENATE IN PETROLATUM BLEND: Brand: XEROFORM

## (undated) DEVICE — SYR BULB 60ML IRRIGATION -- CONVERT TO ITEM 116413

## (undated) DEVICE — RASP SURG W65XL13MM REPL RECIP L TEAR STRYKR FOR REMB COR

## (undated) DEVICE — SET ADMIN 16ML TBNG L100IN 2 Y INJ SITE IV PIGGY BK DISP

## (undated) DEVICE — BANDAGE COMPR SELF ADH 5 YDX4 IN TAN STRL PREMIERPRO LF

## (undated) DEVICE — GOWN,SIRUS,FABRNF,XL,20/CS: Brand: MEDLINE

## (undated) DEVICE — NEEDLE HYPO 18GA L1.5IN PNK S STL HUB POLYPR SHLD REG BVL

## (undated) DEVICE — CURITY NON-ADHERENT STRIPS: Brand: CURITY

## (undated) DEVICE — SOLIDIFIER MEDC 1200ML -- CONVERT TO 356117

## (undated) DEVICE — FORCEPS BX L240CM JAW DIA2.8MM L CAP W/ NDL MIC MESH TOOTH

## (undated) DEVICE — 1200 GUARD II KIT W/5MM TUBE W/O VAC TUBE: Brand: GUARDIAN

## (undated) DEVICE — CATH IV AUTOGRD BC BLU 22GA 25 -- INSYTE

## (undated) DEVICE — KIT COLON W/ 1.1OZ LUB AND 2 END

## (undated) DEVICE — 1010 S-DRAPE TOWEL DRAPE 10/BX: Brand: STERI-DRAPE™

## (undated) DEVICE — SURGICAL PROCEDURE PACK BASIN MAJ SET CUST NO CAUT

## (undated) DEVICE — ARGYLE FRAZIER SURGICAL SUCTION INSTRUMENT 10 FR/CH (3.3 MM): Brand: ARGYLE

## (undated) DEVICE — SOLUTION IV 1000ML 0.9% SOD CHL

## (undated) DEVICE — BAG BELONG PT PERS CLEAR HANDL

## (undated) DEVICE — (D)PREP SKN CHLRAPRP APPL 26ML -- CONVERT TO ITEM 371833

## (undated) DEVICE — BASIN EMSIS 16OZ GRAPHITE PLAS KID SHP MOLD GRAD FOR ORAL

## (undated) DEVICE — TRIANGLE BLADE: Brand: ENDOTRAC

## (undated) DEVICE — SUTURE MCRYL SZ 3-0 L27IN ABSRB UD L26MM SH 1/2 CIR Y416H

## (undated) DEVICE — DEVON™ KNEE AND BODY STRAP 60" X 3" (1.5 M X 7.6 CM): Brand: DEVON

## (undated) DEVICE — Device

## (undated) DEVICE — ROCKER SWITCH PENCIL BLADE ELECTRODE, HOLSTER: Brand: EDGE

## (undated) DEVICE — CONTAINER SPEC 20 ML LID NEUT BUFF FORMALIN 10 % POLYPR STS

## (undated) DEVICE — DRAPE C ARM W54XL84IN MINI FOR OEC 6800

## (undated) DEVICE — BLADE SAW OSC COARSE 25X9MM --

## (undated) DEVICE — SYR 3ML LL TIP 1/10ML GRAD --

## (undated) DEVICE — STRETCH BANDAGE ROLL: Brand: DERMACEA

## (undated) DEVICE — DRAPE,REIN 53X77,STERILE: Brand: MEDLINE

## (undated) DEVICE — ADULT SPO2 SENSOR: Brand: NELLCOR

## (undated) DEVICE — NEEDLE HYPO 25GA L1.5IN BVL ORIENTED ECLIPSE

## (undated) DEVICE — ZIMMER® STERILE DISPOSABLE TOURNIQUET CUFF WITH PROTECTIVE SLEEVE AND PLC, DUAL PORT, SINGLE BLADDER, 18 IN. (46 CM)

## (undated) DEVICE — 2.9MM PROXIMAL CORTEX DRILL, MANUAL: Brand: OSTEOMED

## (undated) DEVICE — DRAPE,U/ SHT,SPLIT,PLAS,STERIL: Brand: MEDLINE

## (undated) DEVICE — (D)STRIP SKN CLSR 0.5X4IN WHT --

## (undated) DEVICE — DRAPE,EXTREMITY,89X128,STERILE: Brand: MEDLINE

## (undated) DEVICE — CANNULA CUSH AD W/ 14FT TBG

## (undated) DEVICE — SUTURE MCRYL SZ 3-0 L27IN ABSRB UD L19MM PS-2 3/8 CIR PRIM Y427H

## (undated) DEVICE — BASIC PACK: Brand: CONVERTORS

## (undated) DEVICE — HOOK LOCK LATEX FREE ELASTIC BANDAGE 4INX5YD

## (undated) DEVICE — GAUZE SPONGES,12 PLY: Brand: CURITY

## (undated) DEVICE — TOWEL SURG W17XL27IN STD BLU COT NONFENESTRATED PREWASHED

## (undated) DEVICE — SYR 10ML LUER LOK 1/5ML GRAD --

## (undated) DEVICE — SUTURE MCRYL SZ 4-0 L27IN ABSRB UD L19MM PS-2 1/2 CIR PRIM Y426H

## (undated) DEVICE — BANDAGE COMPR 9 FTX4 IN SMOOTH COMFORTABLE SYNTH ESMRK LF

## (undated) DEVICE — MASTISOL ADHESIVE LIQ 2/3ML

## (undated) DEVICE — BITEBLOCK ENDOSCP 60FR MAXI WHT POLYETH STURDY W/ VELC WVN